# Patient Record
Sex: MALE | Race: WHITE | Employment: OTHER | URBAN - METROPOLITAN AREA
[De-identification: names, ages, dates, MRNs, and addresses within clinical notes are randomized per-mention and may not be internally consistent; named-entity substitution may affect disease eponyms.]

---

## 2017-01-03 NOTE — TELEPHONE ENCOUNTER
Last Visit: 12/19/2016 with ANASTASIA Perez    Next Appointment: noted to f/u in 3 weeks   Previous Refill Encounters: 12/29/2016 per ANASTASIA Perez #40    Requested Prescriptions     Pending Prescriptions Disp Refills    HYDROcodone-acetaminophen (NORCO) 5-325 mg per tablet 40 Tab 0     Sig: Take 1 Tab by mouth every six (6) hours as needed for Pain. Max Daily Amount: 4 Tabs.  DO NOT FILL BEFORE 01/08/2017

## 2017-01-04 RX ORDER — HYDROCODONE BITARTRATE AND ACETAMINOPHEN 5; 325 MG/1; MG/1
1 TABLET ORAL
Qty: 40 TAB | Refills: 0 | Status: SHIPPED | OUTPATIENT
Start: 2017-01-08 | End: 2017-01-30 | Stop reason: SDUPTHER

## 2017-01-10 ENCOUNTER — DOCUMENTATION ONLY (OUTPATIENT)
Dept: ORTHOPEDIC SURGERY | Age: 52
End: 2017-01-10

## 2017-01-10 NOTE — PROGRESS NOTES
Received records request from [facility]. [address] 1/6/17. Faxed to Dary RAMIREZ at [facility].

## 2017-01-13 ENCOUNTER — OFFICE VISIT (OUTPATIENT)
Dept: ORTHOPEDIC SURGERY | Age: 52
End: 2017-01-13

## 2017-01-13 VITALS
BODY MASS INDEX: 23.62 KG/M2 | HEART RATE: 65 BPM | DIASTOLIC BLOOD PRESSURE: 78 MMHG | TEMPERATURE: 98.2 F | HEIGHT: 70 IN | WEIGHT: 165 LBS | SYSTOLIC BLOOD PRESSURE: 120 MMHG | RESPIRATION RATE: 18 BRPM

## 2017-01-13 DIAGNOSIS — G89.4 CHRONIC PAIN SYNDROME: ICD-10-CM

## 2017-01-13 DIAGNOSIS — M54.2 NECK PAIN: ICD-10-CM

## 2017-01-13 DIAGNOSIS — Z98.1 S/P CERVICAL SPINAL FUSION: Primary | ICD-10-CM

## 2017-01-13 DIAGNOSIS — M79.2 NEUROPATHIC PAIN: ICD-10-CM

## 2017-01-13 PROBLEM — M48.02 CERVICAL SPINAL STENOSIS: Status: ACTIVE | Noted: 2017-01-13

## 2017-01-13 RX ORDER — AMITRIPTYLINE HYDROCHLORIDE 50 MG/1
50 TABLET, FILM COATED ORAL
Qty: 30 TAB | Refills: 3 | Status: SHIPPED | OUTPATIENT
Start: 2017-01-13 | End: 2019-04-24

## 2017-01-13 NOTE — PROGRESS NOTES
[address]  [address], [address]  [facility], [address]  Phone: [phone]  Fax: [phone]  PROGRESS NOTE  Patient: Freddy Clifford                MRN: [id number]       SSN: [id number]  YOB: 1965        AGE: 49 y.o. SEX: male  Body mass index is 23.68 kg/(m^2). PCP: None  01/13/17    Chief Complaint   Patient presents with    Neck Pain     MRI/EMG        HISTORY OF PRESENT ILLNESS, RADIOGRAPHS, and PLAN:     HISTORY:  Mr. Lilly Green returns today. We will provide him some Elavil to see if that assists him with some of the neuralgia that he has. His EMG demonstrates no active radiculopathy. He has some carpal tunnel syndrome in his hand. He shows a nerve decompression of his cervical spine without any significant residual cervical stenosis. There is nothing surgical to be said for his cervical spine. I have no residual surgical pathology. The shoulder pain that he is having appears to be shoulder in origin, and whether it is surgical in nature is at Dr. Kory Mancia discretion. ASSESSMENT/PLAN: We will provide him some Elavil to see if it helps potentiate whatever neuralgia he has. It is generic medication that he may be able to obtain. Dr. Aruna Chavez is providing him his pain medications. I do not have anything else to offer him at this time. I will see him again as-needed. Past Medical History   Diagnosis Date    Allergy     Arthropathy, unspecified, site unspecified     Neck pain     Right shoulder pain      primarily right    Shoulder pain        Family History   Problem Relation Age of Onset    Cancer Mother      Renal cell    Other Father      Asbestosis       Current Outpatient Prescriptions   Medication Sig Dispense Refill    HYDROcodone-acetaminophen (NORCO) 5-325 mg per tablet Take 1 Tab by mouth every six (6) hours as needed for Pain. Max Daily Amount: 4 Tabs.  40 Tab 0    HYDROcodone-acetaminophen (NORCO) 5-325 mg per tablet Take 1 Tab by mouth every six (6) hours as needed for Pain. Max Daily Amount: 4 Tabs. 60 Tab 0    VIAGRA 100 mg tablet take 1 tablet by mouth 1 hour prior to intercourse as directed 4 Tab 11    vardenafil (LEVITRA) 20 mg tablet Take 20 mg by mouth as needed. 2 Tab 0    HYDROcodone-acetaminophen (NORCO) 5-325 mg per tablet Take 1 Tab by mouth every six (6) hours as needed for Pain. Max Daily Amount: 4 Tabs. DO NOT FILL BEFORE 01/08/2017 40 Tab 0    naproxen sodium (ALEVE) 220 mg cap Take  by mouth.  DULoxetine (CYMBALTA) 60 mg capsule Take 60 mg by mouth daily.  naproxen (NAPROSYN) 500 mg tablet Take 500 mg by mouth two (2) times daily (with meals).  DULOXETINE HCL (CYMBALTA PO) Take  by mouth. Allergies   Allergen Reactions    Latex Rash       Past Surgical History   Procedure Laterality Date    Hx orthopaedic       Left knee ACL repair    Hx acl reconstruction      Hx mohs procedure Right     Hx cervical fusion  2010       Past Medical History   Diagnosis Date    Allergy     Arthropathy, unspecified, site unspecified     Neck pain     Right shoulder pain      primarily right    Shoulder pain        Social History     Social History    Marital status:      Spouse name: N/A    Number of children: N/A    Years of education: N/A     Occupational History    Not on file. Social History Main Topics    Smoking status: Current Every Day Smoker     Packs/day: 0.50     Types: Cigarettes    Smokeless tobacco: Not on file    Alcohol use No    Drug use: No    Sexual activity: Not on file     Other Topics Concern    Not on file     Social History Narrative    ** Merged History Encounter **              REVIEW OF SYSTEMS:   CONSTITUTIONAL SYMPTOMS:  Negative. EYES:  Negative. EARS, NOSE, THROAT AND MOUTH:  Negative. CARDIOVASCULAR:  Negative. RESPIRATORY:  Negative.    GENITOURINARY: Negative. GASTROINTESTINAL:  Negative. INTEGUMENTARY (SKIN AND/OR BREAST):  Negative. MUSCULOSKELETAL: Per HPI.   ENDOCRINE/RHEUMATOLOGIC:  Negative. NEUROLOGICAL:  Per HPI. HEMATOLOGIC/LYMPHATIC:  Negative. ALLERGIC/IMMUNOLOGIC:  Negative. PSYCHIATRIC:  Negative. PHYSICAL EXAMINATION:   Visit Vitals    /78 (BP 1 Location: Left arm, BP Patient Position: Sitting)    Pulse 65    Temp 98.2 °F (36.8 °C) (Oral)    Resp 18    Ht 5' 10\" (1.778 m)    Wt 165 lb (74.8 kg)    BMI 23.68 kg/m2    PAIN SCALE: 10 - Worst pain ever/10    CONSTITUTIONAL: The patient is in no apparent distress and is alert and oriented x 3. HEENT: Normocephalic. Hearing grossly intact. NECK: Supple and symmetric. no tenderness, or masses were felt. RESPIRATORY: No labored breathing. CARDIOVASCULAR: The carotid pulses were normal. Peripheral pulses were 2+. CHEST: Normal AP diameter and normal contour without any kyphoscoliosis. LYMPHATIC: No lymphadenopathy was appreciated in the neck, axillae or groin. SKIN: Negative for scars, rashes, lesions, or ulcers on the right upper, right lower, left upper, left lower and trunk. NEUROLOGICAL: Alert and oriented x 3. Ambulation without assistive device. FWB. EXTREMITIES: See musculoskeletal.  MUSCULOSKELETAL:   Head and Neck:  Neck pain that radiates into LUE. Negative for misalignment, asymmetry, crepitation, defects, tenderness masses or effusions.  Left Upper Extremity: Pain. Paresthesia. Inspection, percussion and palpation preformed. Marmolejos sign is negative.  Right Upper Extremity: Inspection, percussion and palpation preformed. Marmolejos sign is negative.  Spine, Ribs and Pelvis: Inspection, percussion and palpation preformed. Negative for misalignment, asymmetry, crepitation, defects, tenderness masses or effusions.  Right Lower Extremity: Burning sensation. Inspection, percussion and palpation preformed. Negative straight leg raise.    Left Lower Extremity: Burning sensation. Inspection, percussion and palpation preformed. Negative straight leg raise. SPINE EXAM:     Cervical spine: Neck is midline. Normal muscle tone. No focal atrophy is noted. ASSESSMENT    ICD-10-CM ICD-9-CM    1. S/P cervical spinal fusion Z98.1 V45.4    2. Neuropathic pain M79.2 729.2 amitriptyline (ELAVIL) 50 mg tablet   3. Chronic pain syndrome G89.4 338.4    4.  Neck pain M54.2 723.1        Written by Roxane Monte, as dictated by Keenan Cesar MD.

## 2017-01-16 RX ORDER — HYDROCODONE BITARTRATE AND ACETAMINOPHEN 5; 325 MG/1; MG/1
TABLET ORAL
Qty: 52 TAB | Refills: 0 | Status: SHIPPED | OUTPATIENT
Start: 2017-01-16 | End: 2017-01-16 | Stop reason: SDUPTHER

## 2017-01-16 RX ORDER — HYDROCODONE BITARTRATE AND ACETAMINOPHEN 5; 325 MG/1; MG/1
TABLET ORAL
Qty: 52 TAB | Refills: 0 | Status: SHIPPED | OUTPATIENT
Start: 2017-01-16 | End: 2017-02-13

## 2017-01-16 NOTE — TELEPHONE ENCOUNTER
Patient called for refill of Norco, 10 day supply, says he called it in on Friday already but no notes in chart.   Please advise patient when ready for pickup at [facility] office at [phone]

## 2017-01-30 RX ORDER — HYDROCODONE BITARTRATE AND ACETAMINOPHEN 5; 325 MG/1; MG/1
1 TABLET ORAL
Qty: 40 TAB | Refills: 0 | Status: SHIPPED | OUTPATIENT
Start: 2017-01-30 | End: 2017-02-13

## 2017-01-30 NOTE — TELEPHONE ENCOUNTER
Patient called to request refill of [place] from Dr. Adonis Lange. States he called this morning at 8 am but wants to make sure request goes through.   Please advise patient when ready for  at [phone]

## 2017-01-30 NOTE — TELEPHONE ENCOUNTER
Last Visit: 12/19/2016 with ANASTASIA Pineda    Next Appointment: noted to f/u in 3 weeks   Previous Refill Encounters: 01/16/2017 per ANASTASIA Sanford #52     Requested Prescriptions     Pending Prescriptions Disp Refills    HYDROcodone-acetaminophen (NORCO) 5-325 mg per tablet 40 Tab 0     Sig: Take 1 Tab by mouth every six (6) hours as needed for Pain. Max Daily Amount: 4 Tabs.

## 2017-02-08 RX ORDER — HYDROCODONE BITARTRATE AND ACETAMINOPHEN 5; 325 MG/1; MG/1
1 TABLET ORAL
Qty: 40 TAB | Refills: 0 | Status: CANCELLED | OUTPATIENT
Start: 2017-02-09

## 2017-02-08 NOTE — TELEPHONE ENCOUNTER
PATIENT CALLED FOR REFILL ON NORCO MEDICATION FROM . WILL  FROM [facility] LOCATION. PATIENT TEL. [phone].

## 2017-02-08 NOTE — TELEPHONE ENCOUNTER
Last Visit: 12/19/2016 with ANASTASIA Lyles    Next Appointment: noted to f/u in 3 weeks   Previous Refill Encounters: 01/30/2017 with ANASTASIA Lyles #40    Requested Prescriptions     Pending Prescriptions Disp Refills    HYDROcodone-acetaminophen (NORCO) 5-325 mg per tablet 40 Tab 0     Sig: Take 1 Tab by mouth every six (6) hours as needed for Pain. Max Daily Amount: 4 Tabs.  DO NOT FILL BEFORE 02/09/2017

## 2017-02-13 ENCOUNTER — OFFICE VISIT (OUTPATIENT)
Dept: ORTHOPEDIC SURGERY | Age: 52
End: 2017-02-13

## 2017-02-13 DIAGNOSIS — M25.522 LEFT ELBOW PAIN: ICD-10-CM

## 2017-02-13 DIAGNOSIS — M75.22 BICEPS TENDONITIS, LEFT: Primary | ICD-10-CM

## 2017-02-13 RX ORDER — HYDROCODONE BITARTRATE AND ACETAMINOPHEN 5; 325 MG/1; MG/1
1 TABLET ORAL
Qty: 60 TAB | Refills: 0 | Status: SHIPPED | OUTPATIENT
Start: 2017-02-13 | End: 2019-04-24

## 2017-02-13 NOTE — PROGRESS NOTES
Chalino Henry  1965   No chief complaint on file. HISTORY OF PRESENT ILLNESS  Chalino Henry is a 47 y.o. male who presents today for reevaluation of left arm pain. He states that the injection he received last visit made his arm worse. He states the arm pain is from his neck down to his hand. Sharp pain worse with any activity. States pain is a 10/10. Patient denies any fever, chills, chest pain, shortness of breath or calf pain. There are no new illness or injuries to report since last seen in the office. No changes in medications, allergies, social or family history. PHYSICAL EXAM:   There were no vitals taken for this visit. The patient is a well-developed, well-nourished male   in no acute distress. The patient is alert and oriented times three. The patient is alert and oriented times three. Mood and affect are normal.  LYMPHATIC: lymph nodes are not enlarged and are within normal limits  SKIN: normal in color and non tender to palpation. There are no bruises or abrasions noted. NEUROLOGICAL: Motor sensory exam is within normal limits. Reflexes are equal bilaterally.  There is normal sensation to pinprick and light touch  MUSCULOSKELETAL:  Examination Left shoulder   Skin Intact   AC joint tenderness -   Biceps tenderness +   Forward flexion/Elevation    Active abduction    Glenohumeral abduction 90   External rotation ROM 90   Internal rotation ROM 70   Apprehension -   Garrets Relocation -   Jerk -   Load and Shift -   Obriens -   Speeds -   Impingement sign -   Supraspinatus/Empty Can -, 5/5   External Rotation Strength -, 5/5   Lift Off/Belly Press -, 5/5   Neurovascular Intact      Examination Left Hand   Skin Intact   Deformity -   Swelling -   Tenderness -   Tenderness A1 Pulley -   Finger flexion Full   Finger extension Full   Thenar Eminence Atrophy -   Sensation Normal   Capillary refill -   Heberden's nodes -   Dupuytren's -      Examination Left Wrist   Skin Intact   Tenderness -   Flexion 60   Extension 60   Deformity -   Effusion -   Tinnel's sign +   Phalen's test +   Finklestein maneuver -   Pain with thumb abduction -        Examination Left Elbow   Skin Intact   Range of Motion 0-135   Tenderness Medial Epicondyle +   Tenderness Lateral Epicondyle +   Tenderness Olecranon Bursa -   Swelling -   Bruising -   Stability Normal   Motor Strength  Normal   Neurovascular Intact            IMPRESSION:      ICD-10-CM ICD-9-CM    1. Biceps tendonitis, left M75.22 726.12 NORCO 5-325 mg per tablet      CANCELED: MRI SHOULDER LT WO CONT   2. Left elbow pain M25.522 719.42         PLAN:   1. Patient continues to have problems despite treatment. From a surgical standpoint do not see that a biceps tenodesis would take away the pain that patient is describing. 2. Will order MRI of left elbow r/o internal derangement  3. One time rx of Norco 5 given  4.  Will refer to pain management  RTC following MRI with Dr. Odalys James    Patient seen and evaluated by Dr. Odalys James today who agrees with treatment plan    [facility]

## 2017-04-27 ENCOUNTER — TELEPHONE (OUTPATIENT)
Dept: ORTHOPEDIC SURGERY | Age: 52
End: 2017-04-27

## 2017-04-27 NOTE — TELEPHONE ENCOUNTER
Veronica Ricketts with [facility] called and stated pt \"no showed\" for his 4/14 appt with them. Any questions, Veronica Ricketts can be reached at [phone].

## 2019-01-16 ENCOUNTER — DOCUMENTATION ONLY (OUTPATIENT)
Dept: ORTHOPEDIC SURGERY | Age: 54
End: 2019-01-16

## 2019-01-16 NOTE — PROGRESS NOTES
I HAD VOICE MESSAGE LEFT FOR ME TO CALL REGARDING FINANCIAL ASSISTANCE. TRIED CALLING PATIENT AND CALL WOULD NOT GO THROUGH. NEED A VALID NUMBER.   Farheen Rankin

## 2019-04-24 ENCOUNTER — HOSPITAL ENCOUNTER (EMERGENCY)
Age: 54
Discharge: HOME | End: 2019-04-24
Attending: EMERGENCY MEDICINE
Payer: MEDICAID

## 2019-04-24 VITALS
SYSTOLIC BLOOD PRESSURE: 128 MMHG | OXYGEN SATURATION: 100 % | HEART RATE: 77 BPM | RESPIRATION RATE: 20 BRPM | HEIGHT: 70 IN | DIASTOLIC BLOOD PRESSURE: 83 MMHG | WEIGHT: 200 LBS | BODY MASS INDEX: 28.63 KG/M2 | TEMPERATURE: 98 F

## 2019-04-24 DIAGNOSIS — J02.9 SORE THROAT: ICD-10-CM

## 2019-04-24 DIAGNOSIS — F17.210 CIGARETTE NICOTINE DEPENDENCE WITHOUT COMPLICATION: ICD-10-CM

## 2019-04-24 DIAGNOSIS — Z77.098 EXPOSURE TO CHEMICAL INHALATION: Primary | ICD-10-CM

## 2019-04-24 PROCEDURE — 99281 EMR DPT VST MAYX REQ PHY/QHP: CPT

## 2019-04-24 NOTE — DISCHARGE INSTRUCTIONS
You were seen and evaluated in the Emergency Department. Please understand that your work up is not all encompassing and you should follow up with your primary care physician for further management and continuity of care. Please return to Emergency Department or seek medical attention immediately if you have acute worsening in your symptoms or develop chest pain, shortness of breath, repeated vomiting, fever, altered level of consciousness, coughing up blood, or start sweating and feel clammy. If you were prescribed any medicine for home, please take as prescribed by your health-care provider. If you were given any follow-up appointments or numbers to call, please do so as instructed. Avoid any tobacco products or excessive alcohol. Patient Education        Exposure to Toxins: Care Instructions  Your Care Instructions    Toxins are poisonous substances that can harm your body. If your doctor is concerned that your symptoms are caused by exposure to a toxic substance, he or she may ask you about your home, your work, your family, and other aspects of your environment. You also may have blood tests or X-rays to find out if a toxin is in your body. For example, you may have been around smoke from a fire. Or you may have been around fumes from paints, solvents, or waste products from workshops or factories. But in some cases it may be hard to find out what you may have been exposed to. Sometimes it can take years before you have symptoms. 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 And being exposed to some toxins can make health problems you already have worse. Follow-up care is a key part of your treatment and safety. Be sure to make and go to all appointments, and call your doctor if you are having problems. It's also a good idea to know your test results and keep a list of the medicines you take. How can you care for yourself at home?   · If you think you may have been exposed to a toxin but are not sure what it might be, try to keep a written record of your symptoms. Note when and where you have symptoms. And note any possible health hazards. For example, you may find out that you feel sick to your stomach during your workweek, but you feel better on weekends. · It may help to increase the amount of fresh air in your home. · If you can, try to control your exposure to hazardous materials. ? Avoid cigarette smoke. Cigarettes contain many chemicals that are hazardous to your health. ? Keep your home clean and as free from dust as you can. Dust can irritate your lungs. ? Get a carbon monoxide alarm for your home. Carbon monoxide comes from cars, space heaters, and other heat sources. It can be deadly. ? When you use cleaning or home improvement products, make sure to open windows or use an exhaust fan. Never mix household chemicals, such as chlorine and ammonia. Some mixtures can create toxic fumes that can be deadly. ? Read the label on house and garden chemicals. Be sure to follow all safety directions. Try to limit your use of lawn and garden pesticides. ? Keep in mind that the farther away you are from a hazardous source, the less exposure you will receive. When should you call for help? Call 911 anytime you think you may need emergency care. For example, call if:    · You have trouble breathing.    Call your doctor now or seek immediate medical care if:    · You get household chemicals in your mouth or eyes. Call the [address] [place] [address] ([phone]).     · You think you may have been exposed to a hazardous material.    Watch closely for changes in your health, and be sure to contact your doctor if:    · You do not get better as expected. Where can you learn more? Go to http://fabiana-justino.info/. Enter B226 in the search box to learn more about \"Exposure to Toxins: Care Instructions. \"  Current as of: March 28, 2018  Content Version: 11.9  © 6494-9029 [employer]wise, [employer]orporated. Care instructions adapted under license by [employer] (which disclaims liability or warranty for this information). If you have questions about a medical condition or this instruction, always ask your healthcare professional. [address] any warranty or liability for your use of this information.

## 2019-04-24 NOTE — ED TRIAGE NOTES
Patient states that he did his dad's floor 3 weeks ago with polyurethane and states that he has a terrible taste in his mouth. Patient has been seen at [facility] and they said they can't do anything.

## 2019-04-24 NOTE — ED PROVIDER NOTES
EMERGENCY DEPARTMENT HISTORY AND PHYSICAL EXAM 
 
Date: 4/24/2019 Patient Name: Glenn Means History of Presenting Illness Chief Complaint Patient presents with  Chemical exposure History Provided By: Patient Additional History (Context): Glenn Means is a 49 y.o. male with PMHX of COPD, active smoker presents to the emergency department C/O persistent sore throat since the patient was exposed to polyurethane 3 weeks ago. On my initial assessment, patient asked \"how do I get the polyurethane out of my system\". Patient reports persistent sore throat and states that he is only been eating potato chips because the assault helps his sore throat. On reviewing patient's past medical history, patient was seen on April 10 and April 15 at an outlying emergency department for exposure to the polyurethane. Patient states that he was helping his dad clean floors and was exposed to the toxin for 1 day. Patient had chest x-ray on April 10 which showed no acute pulmonary process. He then was seen again on April 15 after intentionally drinking \"pain thinner\" in an attempt to \"get the polyurethane out of my system\". Patient then had a repeat chest x-ray on April 15 which was again showing no acute process. Pt denies suicidal ideation, intentional self injury, chest pain, shortness of breath, abdominal pain and any other sxs or complaints. PCP: None Past History Past Medical History: 
Past Medical History:  
Diagnosis Date  Allergy  Arthropathy, unspecified, site unspecified  Neck pain  Right shoulder pain   
 primarily right  Shoulder pain Past Surgical History: 
Past Surgical History:  
Procedure Laterality Date  HX ACL RECONSTRUCTION    
 HX CERVICAL FUSION  2010  HX MOHS PROCEDURES Right  HX ORTHOPAEDIC Left knee ACL repair Family History: 
Family History Problem Relation Age of Onset  Cancer Mother Renal cell  Other Father Asbestosis Social History: 
Social History Tobacco Use  Smoking status: Current Every Day Smoker Packs/day: 0.50 Types: Cigarettes  Smokeless tobacco: Never Used Substance Use Topics  Alcohol use: No  
 Drug use: No  
 
 
Allergies: Allergies Allergen Reactions  Latex Rash Review of Systems Review of Systems Constitutional: Negative for chills and fever. HENT: Positive for sore throat. Negative for congestion, ear pain and sinus pain. Eyes: Negative for pain and visual disturbance. Respiratory: Negative for cough and shortness of breath. Cardiovascular: Negative for chest pain and leg swelling. Gastrointestinal: Negative for abdominal pain, constipation, diarrhea, nausea and vomiting. Genitourinary: Negative for dysuria and hematuria. Musculoskeletal: Negative for back pain and neck pain. Skin: Negative for pallor and rash. Neurological: Negative for dizziness, tremors, weakness, light-headedness and headaches. All other systems reviewed and are negative. Physical Exam  
 
Vitals:  
 04/24/19 1800 BP: 128/83 Pulse: 77 Resp: 20 Temp: 98 °F (36.7 °C) SpO2: 100% Weight: 90.7 kg (200 lb) Height: 5' 10\" (1.778 m) Physical Exam 
 
Nursing note and vitals reviewed Constitutional: Middle-aged  male, no acute distress Head: Normocephalic, Atraumatic Eyes: Pupils are equal, round, and reactive to light, EOMI Neck: Supple, non-tender ENT: Mild pharyngeal erythema with no exudates, no pharyngeal edema, uvula midline Cardiovascular: Regular rate and rhythm, no murmurs, rubs, or gallops Chest: Normal work of breathing and chest excursion bilaterally Lungs: Clear to ausculation bilaterally, no wheezes, no rhonchi Abdomen: Soft, non tender, non distended, normoactive bowel sounds Back: No evidence of trauma or deformity Extremities: No evidence of trauma or deformity, no LE edema Skin: Warm and dry, normal cap refill Neuro: Alert and appropriate, CN intact, normal speech, moving all 4 extremities freely and symmetrically Psychiatric: Normal mood and affect Diagnostic Study Results Labs - No results found for this or any previous visit (from the past 12 hour(s)). Radiologic Studies - No orders to display CT Results  (Last 48 hours) None CXR Results  (Last 48 hours) None Medical Decision Making I am the first provider for this patient. I reviewed the vital signs, available nursing notes, past medical history, past surgical history, family history and social history. Vital Signs-Reviewed the patient's vital signs. Pulse Oximetry Analysis -100 % on room air Records Reviewed: Nursing Notes and Old Medical Records Provider Notes:  
49 y.o. male presenting with persistent sore throat after being exposed to polyurethane aproximally 3 weeks ago. On exam patient is afebrile with appropriate vital signs. In no respiratory distress. Saturating 100% on room air. Pharyngeal exam showed mild erythema with no exudate, no swelling, uvula midline. Patient reassured that as long as he is no longer exposed to the toxin, that he should experience no further side effects. Urged the patient that he should discontinue just eating potato chips for his sore throat. Urged oral hydration and smoking cessation. No indication for imaging studies this patient has had 2 chest x-rays on April 10 and April 15 which showed no acute lung process. Procedures: 
Procedures ED Course:  
6:04 PM 
 Initial assessment performed. The patients presenting problems have been discussed, and they are in agreement with the care plan formulated and outlined with them. I have encouraged them to ask questions as they arise throughout their visit. Diagnosis and Disposition DISCHARGE NOTE: 
6:28 PM 
 
 Fior Cassette  results have been reviewed with him. He has been counseled regarding his diagnosis, treatment, and plan. He verbally conveys understanding and agreement of the signs, symptoms, diagnosis, treatment and prognosis and additionally agrees to follow up as discussed. He also agrees with the care-plan and conveys that all of his questions have been answered. I have also provided discharge instructions for him that include: educational information regarding their diagnosis and treatment, and list of reasons why they would want to return to the ED prior to their follow-up appointment, should his condition change. He has been provided with education for proper emergency department utilization. CLINICAL IMPRESSION: 
 
1. Exposure to chemical inhalation 2. Sore throat 3. Cigarette nicotine dependence without complication PLAN: 
1. D/C Home 2. There are no discharge medications for this patient. 3.  
Follow-up Information Follow up With Specialties Details Why Contact Info [facility]  Schedule an appointment as soon as possible for a visit in 2 days   [address], [address] [place] [address] [phone] [facility] EMERGENCY DEPT Emergency Medicine  As needed if symptoms worsen [address] [place] [address] [phone]  
  
 
____________________________________ Please note that this dictation was completed with [employer], the computer voice recognition software. Quite often unanticipated grammatical, syntax, homophones, and other interpretive errors are inadvertently transcribed by the computer software. Please disregard these errors. Please excuse any errors that have escaped final proofreading.

## 2019-08-29 ENCOUNTER — DOCUMENTATION ONLY (OUTPATIENT)
Dept: ORTHOPEDIC SURGERY | Age: 54
End: 2019-08-29

## 2019-11-25 ENCOUNTER — OFFICE VISIT (OUTPATIENT)
Dept: ORTHOPEDIC SURGERY | Age: 54
End: 2019-11-25

## 2019-11-25 VITALS
HEART RATE: 73 BPM | TEMPERATURE: 97.1 F | WEIGHT: 165.8 LBS | BODY MASS INDEX: 23.74 KG/M2 | SYSTOLIC BLOOD PRESSURE: 117 MMHG | DIASTOLIC BLOOD PRESSURE: 77 MMHG | HEIGHT: 70 IN | RESPIRATION RATE: 16 BRPM | OXYGEN SATURATION: 97 %

## 2019-11-25 DIAGNOSIS — M25.512 LEFT SHOULDER PAIN, UNSPECIFIED CHRONICITY: ICD-10-CM

## 2019-11-25 DIAGNOSIS — M75.102 TEAR OF LEFT ROTATOR CUFF, UNSPECIFIED TEAR EXTENT, UNSPECIFIED WHETHER TRAUMATIC: Primary | ICD-10-CM

## 2019-11-25 RX ORDER — MELOXICAM 15 MG/1
15 TABLET ORAL
Qty: 30 TAB | Refills: 1 | Status: SHIPPED | OUTPATIENT
Start: 2019-11-25 | End: 2022-02-21

## 2019-11-25 NOTE — PROGRESS NOTES
1. Have you been to the ER, urgent care clinic since your last visit? Hospitalized since your last visit? No    2. Have you seen or consulted any other health care providers outside of the [address] [place] [address] since your last visit? Include any pap smears or colon screening.  No

## 2019-11-25 NOTE — PROGRESS NOTES
Bisi Soria  1965   Chief Complaint   Patient presents with    Shoulder Pain     Left shoulder pain        HISTORY OF PRESENT ILLNESS  Bisi Soria is a 49 y.o. male who presents today for reevaluation of left shoulder pain. Patient rates pain as 8/10 today. Pain has been present for awhile. Pt was last seen here years ago and the pain has not resolved. Pt also has neck issues. He describes a dull, achy, throbbing pain in the shoulder. Pt reports pain at night when sleeping on his left side. Has been taking Ibuprofen and Aleve without relief. Has tried cortisone injections without relief. Patient denies any fever, chills, chest pain, shortness of breath or calf pain. The remainder of the review of systems is negative. There are no new illness or injuries to report since last seen in the office. There are no changes to medications, allergies, family or social history. Pain Assessment  11/25/2019   Location of Pain Shoulder   Location Modifiers Left   Severity of Pain 8   Quality of Pain Sharp   Duration of Pain Persistent   Frequency of Pain Constant   Aggravating Factors Bending;Stretching   Aggravating Factors Comment -   Limiting Behavior Yes   Relieving Factors Rest   Relieving Factors Comment -   Result of Injury No       PHYSICAL EXAM:   Visit Vitals  /77   Pulse 73   Temp 97.1 °F (36.2 °C) (Oral)   Resp 16   Ht 5' 10\" (1.778 m)   Wt 165 lb 12.8 oz (75.2 kg)   SpO2 97%   BMI 23.79 kg/m²     The patient is a well-developed, well-nourished male   in no acute distress. The patient is alert and oriented times three. The patient is alert and oriented times three. Mood and affect are normal.  LYMPHATIC: lymph nodes are not enlarged and are within normal limits  SKIN: normal in color and non tender to palpation. There are no bruises or abrasions noted. NEUROLOGICAL: Motor sensory exam is within normal limits. Reflexes are equal bilaterally.  There is normal sensation to pinprick and light touch  MUSCULOSKELETAL:  Examination Left shoulder   Skin Intact   AC joint tenderness -   Biceps tenderness -   Forward flexion/Elevation    Active abduction    Glenohumeral abduction 90   External rotation ROM 90   Internal rotation ROM 70   Apprehension -   Garrets Relocation -   Jerk -   Load and Shift -   Obriens -   Speeds -   Impingement sign -   Supraspinatus/Empty Can -, 5/5   External Rotation Strength -, 5/5   Lift Off/Belly Press -, 5/5   Neurovascular Intact        IMAGING: XR of left shoulder dated 11/25/19 was reviewed and read by Dr. Rubens Fisher: no acute abnormalities       MRI of left shoulder with contrast dated 9/20/16 was reviewed and read by Dr. Rubens Fisher:   IMPRESSION:  1. Mild supraspinatus and infraspinatus insertional tendinosis. No rotator cuff  tear. 2. Type II SLAP tear probably related to and predispose by adjacent Paty  variant labral morphology. Labral tear does not propagate into the adjacent  bicipital anchor nor the anterior labrum. 3. Moderate intra-articular long biceps tendinosis. 4. Mild degenerative osteoarthropathy of the left acromioclavicular joint,  without morphology or secondary findings of subacromial impingement. IMPRESSION:      ICD-10-CM ICD-9-CM    1. Tear of left rotator cuff, unspecified tear extent, unspecified whether traumatic M75.102 840.4 MRI SHOULDER LT WO CONT      meloxicam (MOBIC) 15 mg tablet   2. Left shoulder pain, unspecified chronicity M25.512 719.41 AMB POC XRAY, SHOULDER; COMPLETE, 2+        PLAN:   1. Pt presents today with left shoulder pain and I would like to get an MRI to r/o a rotator cuff tear. Risk factors include: n/a  2. No ultrasound exam indicated today  3. No cortisone injection indicated today   4. No Physical/Occupational Therapy indicated today  5. Yes diagnostic test indicated today: MRI L SHOULDER  6. No durable medical equipment indicated today  7. No referral indicated today   8.  Yes medications indicated today: MOBIC  9. No Narcotic indicated today      RTC following MRI      Scribed by Reinaldo Granados [address]) as dictated by MD SHANTE Gonzalez, Dr. Trudi Burrell, confirm that all documentation is accurate.     Trudi Burrell M.D.   [facility]

## 2019-11-25 NOTE — PATIENT INSTRUCTIONS
If we order a Diagnostic test (such as MRI or CT) during your office visit please see below:     Coordination of Care will be calling you to schedule your diagnostic test. If you have not heard from Coordination of Care within 5 business days, please call [phone]. Once you have a date scheduled for your diagnostic test, you will need to contact our office to schedule a follow up appointment, as this is when the physician will review your diagnostic test results with you. You can contact our office to schedule appointment by phone at [phone], or you can send a message via [employer] to request an appointment.

## 2022-02-18 PROBLEM — J18.9 PNEUMONIA: Status: ACTIVE | Noted: 2022-02-18

## 2022-02-18 PROBLEM — R41.82 AMS (ALTERED MENTAL STATUS): Status: ACTIVE | Noted: 2022-02-18
